# Patient Record
Sex: MALE | Race: WHITE | HISPANIC OR LATINO | URBAN - NONMETROPOLITAN AREA
[De-identification: names, ages, dates, MRNs, and addresses within clinical notes are randomized per-mention and may not be internally consistent; named-entity substitution may affect disease eponyms.]

---

## 2024-01-15 ENCOUNTER — APPOINTMENT (OUTPATIENT)
Dept: PULMONOLOGY | Facility: CLINIC | Age: 29
End: 2024-01-15
Payer: COMMERCIAL

## 2024-01-15 ENCOUNTER — HOSPITAL ENCOUNTER (OUTPATIENT)
Dept: RADIOLOGY | Facility: EXTERNAL LOCATION | Age: 29
Discharge: HOME | End: 2024-01-15

## 2024-01-15 DIAGNOSIS — R06.00 DYSPNEA, UNSPECIFIED TYPE: ICD-10-CM

## 2024-10-18 ENCOUNTER — OFFICE VISIT (OUTPATIENT)
Dept: URGENT CARE | Age: 29
End: 2024-10-18
Payer: COMMERCIAL

## 2024-10-18 VITALS
OXYGEN SATURATION: 99 % | TEMPERATURE: 97.7 F | HEART RATE: 92 BPM | RESPIRATION RATE: 18 BRPM | DIASTOLIC BLOOD PRESSURE: 106 MMHG | SYSTOLIC BLOOD PRESSURE: 148 MMHG

## 2024-10-18 DIAGNOSIS — R06.02 SHORTNESS OF BREATH: Primary | ICD-10-CM

## 2024-10-18 ASSESSMENT — ENCOUNTER SYMPTOMS
CHILLS: 0
FLANK PAIN: 0
VOMITING: 0
CHEST TIGHTNESS: 0
DIARRHEA: 0
SORE THROAT: 0
SINUS PRESSURE: 0
SHORTNESS OF BREATH: 1
COUGH: 1
FATIGUE: 0
ABDOMINAL PAIN: 0
EYE PAIN: 0
FEVER: 0

## 2024-10-18 ASSESSMENT — VISUAL ACUITY: OU: 1

## 2024-10-18 NOTE — PROGRESS NOTES
Subjective   Patient ID: Rusty Lance is a 29 y.o. male. They present today with a chief complaint of No chief complaint on file..    History of Present Illness  Patient is a 29-year-old male with a past medical history of asthma presenting to the clinic with shortness of breath.  Patient states shortness of breath has been present for several days now.  Patient states he ran out of his albuterol and inhaler steroid.  Patient insurance is not in network.  Patient refusing to pay out-of-pocket cost.  Patient was triaged as an immediate care. Patient's vital signs in the clinic are stable.  I did recommend emergency room transfer for patient.  Patient refused to go to the emergency room from here.  I explained to the patient that if he does not want to go to the emergency room that he runs the risk of asthma attack worsening.  Patient states that he understands the risk and signed out AGAINST MEDICAL ADVICE.  Patient signed out AGAINST MEDICAL ADVICE knowing the risk of asthma attack, respiratory failure and death.      History provided by:  Patient  Shortness of Breath  Associated symptoms: cough    Associated symptoms: no abdominal pain, no chest pain, no ear pain, no fever, no sore throat and no vomiting        Past Medical History  Allergies as of 10/18/2024    (Not on File)       (Not in a hospital admission)       History reviewed. No pertinent past medical history.    History reviewed. No pertinent surgical history.         Review of Systems  Review of Systems   Constitutional:  Negative for chills, fatigue and fever.   HENT:  Negative for congestion, ear discharge, ear pain, sinus pressure and sore throat.    Eyes:  Negative for pain.   Respiratory:  Positive for cough and shortness of breath. Negative for chest tightness.    Cardiovascular:  Negative for chest pain.   Gastrointestinal:  Negative for abdominal pain, diarrhea and vomiting.   Genitourinary:  Negative for flank pain.                                   Objective    There were no vitals filed for this visit.  No LMP for male patient.    Physical Exam  Vitals reviewed.   Constitutional:       General: He is awake. He is not in acute distress.     Appearance: Normal appearance. He is well-developed and normal weight. He is not ill-appearing or toxic-appearing.   HENT:      Head: Normocephalic and atraumatic.      Mouth/Throat:      Mouth: Mucous membranes are moist.   Eyes:      General: Vision grossly intact.   Cardiovascular:      Rate and Rhythm: Normal rate and regular rhythm.      Heart sounds: Normal heart sounds, S1 normal and S2 normal. No murmur heard.     No friction rub. No gallop.   Pulmonary:      Effort: Pulmonary effort is normal. No respiratory distress.      Breath sounds: Normal breath sounds. No stridor. No wheezing, rhonchi or rales.   Skin:     General: Skin is warm.   Neurological:      General: No focal deficit present.      Mental Status: He is alert and oriented to person, place, and time. Mental status is at baseline.      Gait: Gait is intact.   Psychiatric:         Mood and Affect: Mood normal.         Behavior: Behavior normal. Behavior is cooperative.         Procedures    Point of Care Test & Imaging Results from this visit  No results found for this visit on 10/18/24.   No results found.    Diagnostic study results (if any) were reviewed by [facility].    Assessment/Plan   Allergies, medications, history, and pertinent labs/EKGs/Imaging reviewed by Deny Brito PA-C.     Medical Decision Making  Patient is a 29-year-old male with a past medical history of asthma presenting to the clinic with shortness of breath.  Patient states shortness of breath has been present for several days now.  Patient states he ran out of his albuterol and inhaler steroid.  Patient insurance is not in network.  Patient refusing to pay out-of-pocket cost.  Patient was triaged as an immediate care. Patient's vital signs in the clinic are  stable.  I did recommend emergency room transfer for patient.  Patient refused to go to the emergency room from here.  I explained to the patient that if he does not want to go to the emergency room that he runs the risk of asthma attack worsening.  Patient states that he understands the risk and signed out AGAINST MEDICAL ADVICE.  Patient signed out AGAINST MEDICAL ADVICE knowing the risk of asthma attack, respiratory failure and death.    In the clinic patient's vital signs were stable.  Physical examination as above.  Patient's lungs were clear with no wheezing, rhonchi, rales.  Patient did ride his bike to get here.  Unknown if shortness of breath was secondary to riding his bike.  Patient does not appear to be in any significant respiratory distress in the clinic.  I did consult my attending physician on the patient case.  However, even though patient does not appear to be in any significant respiratory distress with the symptoms of shortness of breath I am recommending patient go to the emergency room if he does not want to pay for evaluation here.  Patient again refused and signed AMA.    Orders and Diagnoses  There are no diagnoses linked to this encounter.    Medical Admin Record      Patient disposition: ER RECs Signed OUT AMA.    Electronically signed by [facility]  12:14 PM